# Patient Record
Sex: FEMALE | Employment: UNEMPLOYED | URBAN - METROPOLITAN AREA
[De-identification: names, ages, dates, MRNs, and addresses within clinical notes are randomized per-mention and may not be internally consistent; named-entity substitution may affect disease eponyms.]

---

## 2018-10-02 ENCOUNTER — APPOINTMENT (OUTPATIENT)
Dept: ULTRASOUND IMAGING | Facility: CLINIC | Age: 28
End: 2018-10-02
Attending: EMERGENCY MEDICINE
Payer: MEDICAID

## 2018-10-02 ENCOUNTER — HOSPITAL ENCOUNTER (EMERGENCY)
Facility: CLINIC | Age: 28
Discharge: HOME | End: 2018-10-02
Attending: EMERGENCY MEDICINE | Admitting: EMERGENCY MEDICINE
Payer: MEDICAID

## 2018-10-02 VITALS
WEIGHT: 160.5 LBS | SYSTOLIC BLOOD PRESSURE: 95 MMHG | HEART RATE: 106 BPM | RESPIRATION RATE: 14 BRPM | TEMPERATURE: 98.2 F | DIASTOLIC BLOOD PRESSURE: 60 MMHG | OXYGEN SATURATION: 95 %

## 2018-10-02 DIAGNOSIS — O23.41 INFECTION OF URINARY TRACT IN PREGNANCY IN FIRST TRIMESTER: ICD-10-CM

## 2018-10-02 DIAGNOSIS — Z3A.10 10 WEEKS GESTATION OF PREGNANCY: ICD-10-CM

## 2018-10-02 DIAGNOSIS — N39.0 URINARY TRACT INFECTION WITHOUT HEMATURIA, SITE UNSPECIFIED: ICD-10-CM

## 2018-10-02 LAB
ABO + RH BLD: NORMAL
ABO + RH BLD: NORMAL
ALBUMIN UR-MCNC: NEGATIVE MG/DL
AMORPH CRY #/AREA URNS HPF: ABNORMAL /HPF
ANION GAP SERPL CALCULATED.3IONS-SCNC: 10 MMOL/L (ref 3–14)
APPEARANCE UR: ABNORMAL
B-HCG SERPL-ACNC: ABNORMAL IU/L (ref 0–5)
BACTERIA #/AREA URNS HPF: ABNORMAL /HPF
BASOPHILS # BLD AUTO: 0 10E9/L (ref 0–0.2)
BASOPHILS NFR BLD AUTO: 0.2 %
BILIRUB UR QL STRIP: NEGATIVE
BLOOD BANK CMNT PATIENT-IMP: NORMAL
BLOOD BANK CMNT PATIENT-IMP: NORMAL
BUN SERPL-MCNC: 9 MG/DL (ref 7–30)
CALCIUM SERPL-MCNC: 8.7 MG/DL (ref 8.5–10.1)
CHLORIDE SERPL-SCNC: 104 MMOL/L (ref 94–109)
CO2 SERPL-SCNC: 24 MMOL/L (ref 20–32)
COLOR UR AUTO: YELLOW
CREAT SERPL-MCNC: 0.6 MG/DL (ref 0.52–1.04)
DIFFERENTIAL METHOD BLD: ABNORMAL
EOSINOPHIL # BLD AUTO: 0.2 10E9/L (ref 0–0.7)
EOSINOPHIL NFR BLD AUTO: 1.2 %
ERYTHROCYTE [DISTWIDTH] IN BLOOD BY AUTOMATED COUNT: 12 % (ref 10–15)
FETAL CELL SCN BLD QL ROSETTE: NORMAL
GFR SERPL CREATININE-BSD FRML MDRD: >90 ML/MIN/1.7M2
GLUCOSE SERPL-MCNC: 83 MG/DL (ref 70–99)
GLUCOSE UR STRIP-MCNC: NEGATIVE MG/DL
HCT VFR BLD AUTO: 36.7 % (ref 35–47)
HGB BLD-MCNC: 12 G/DL (ref 11.7–15.7)
HGB UR QL STRIP: ABNORMAL
IMM GRANULOCYTES # BLD: 0 10E9/L (ref 0–0.4)
IMM GRANULOCYTES NFR BLD: 0.2 %
KETONES UR STRIP-MCNC: NEGATIVE MG/DL
LEUKOCYTE ESTERASE UR QL STRIP: ABNORMAL
LYMPHOCYTES # BLD AUTO: 4.6 10E9/L (ref 0.8–5.3)
LYMPHOCYTES NFR BLD AUTO: 36.3 %
MCH RBC QN AUTO: 29.1 PG (ref 26.5–33)
MCHC RBC AUTO-ENTMCNC: 32.7 G/DL (ref 31.5–36.5)
MCV RBC AUTO: 89 FL (ref 78–100)
MONOCYTES # BLD AUTO: 0.4 10E9/L (ref 0–1.3)
MONOCYTES NFR BLD AUTO: 3.5 %
NEUTROPHILS # BLD AUTO: 7.4 10E9/L (ref 1.6–8.3)
NEUTROPHILS NFR BLD AUTO: 58.6 %
NITRATE UR QL: NEGATIVE
NRBC # BLD AUTO: 0 10*3/UL
NRBC BLD AUTO-RTO: 0 /100
PH UR STRIP: 7 PH (ref 5–7)
PLATELET # BLD AUTO: 206 10E9/L (ref 150–450)
POTASSIUM SERPL-SCNC: 3.6 MMOL/L (ref 3.4–5.3)
RBC # BLD AUTO: 4.13 10E12/L (ref 3.8–5.2)
RBC #/AREA URNS AUTO: 3 /HPF (ref 0–2)
RH IG VIALS RECOM PATIENT: NORMAL
SODIUM SERPL-SCNC: 138 MMOL/L (ref 133–144)
SOURCE: ABNORMAL
SP GR UR STRIP: 1.01 (ref 1–1.03)
SPECIMEN SOURCE: ABNORMAL
SQUAMOUS #/AREA URNS AUTO: 4 /HPF (ref 0–1)
UROBILINOGEN UR STRIP-MCNC: NORMAL MG/DL (ref 0–2)
WBC # BLD AUTO: 12.6 10E9/L (ref 4–11)
WBC #/AREA URNS AUTO: 7 /HPF (ref 0–5)
WET PREP SPEC: ABNORMAL

## 2018-10-02 PROCEDURE — 85025 COMPLETE CBC W/AUTO DIFF WBC: CPT | Performed by: EMERGENCY MEDICINE

## 2018-10-02 PROCEDURE — 84702 CHORIONIC GONADOTROPIN TEST: CPT | Performed by: EMERGENCY MEDICINE

## 2018-10-02 PROCEDURE — 87086 URINE CULTURE/COLONY COUNT: CPT | Performed by: EMERGENCY MEDICINE

## 2018-10-02 PROCEDURE — 80048 BASIC METABOLIC PNL TOTAL CA: CPT | Performed by: EMERGENCY MEDICINE

## 2018-10-02 PROCEDURE — 87591 N.GONORRHOEAE DNA AMP PROB: CPT | Performed by: EMERGENCY MEDICINE

## 2018-10-02 PROCEDURE — 86900 BLOOD TYPING SEROLOGIC ABO: CPT | Performed by: EMERGENCY MEDICINE

## 2018-10-02 PROCEDURE — 99284 EMERGENCY DEPT VISIT MOD MDM: CPT | Mod: Z6 | Performed by: EMERGENCY MEDICINE

## 2018-10-02 PROCEDURE — 86901 BLOOD TYPING SEROLOGIC RH(D): CPT | Performed by: EMERGENCY MEDICINE

## 2018-10-02 PROCEDURE — 81001 URINALYSIS AUTO W/SCOPE: CPT | Performed by: EMERGENCY MEDICINE

## 2018-10-02 PROCEDURE — 87210 SMEAR WET MOUNT SALINE/INK: CPT | Performed by: EMERGENCY MEDICINE

## 2018-10-02 PROCEDURE — 87491 CHLMYD TRACH DNA AMP PROBE: CPT | Performed by: EMERGENCY MEDICINE

## 2018-10-02 PROCEDURE — 99284 EMERGENCY DEPT VISIT MOD MDM: CPT | Mod: 25

## 2018-10-02 PROCEDURE — 76801 OB US < 14 WKS SINGLE FETUS: CPT

## 2018-10-02 PROCEDURE — 85461 HEMOGLOBIN FETAL: CPT | Performed by: EMERGENCY MEDICINE

## 2018-10-02 RX ORDER — CEPHALEXIN 500 MG/1
500 CAPSULE ORAL 4 TIMES DAILY
Qty: 28 CAPSULE | Refills: 0 | Status: SHIPPED | OUTPATIENT
Start: 2018-10-02 | End: 2018-10-09

## 2018-10-02 RX ORDER — ASPIRIN 81 MG
100 TABLET, DELAYED RELEASE (ENTERIC COATED) ORAL DAILY
Qty: 60 TABLET | Refills: 1 | Status: SHIPPED | OUTPATIENT
Start: 2018-10-02

## 2018-10-02 ASSESSMENT — ENCOUNTER SYMPTOMS
VOMITING: 0
FEVER: 0
ABDOMINAL PAIN: 1
NAUSEA: 1

## 2018-10-02 NOTE — ED AVS SNAPSHOT
[facility], Emergency Department    [address]    [place] [address]    Phone:  [phone]    Fax:  [phone]                                       Jewell Gonzalez   MRN: [id number]    Department:  [facility], Emergency Department   Date of Visit:  10/2/2018           Patient Information     Date Of Birth          1990        Your diagnoses for this visit were:     Urinary tract infection without hematuria, site unspecified        You were seen by Harris Ahn MD.        Discharge Instructions       Please make an appointment to follow up with Your Primary Care Provider as soon as possible if you have any concerns.  Results for orders placed or performed during the hospital encounter of 10/02/18   US OB < 14 Weeks w Transvaginal    Narrative    US OB <14 WEEKS WITH TRANSVAGINAL SINGLE 10/2/2018 8:25 PM     INDICATION: Bleeding in pregnancy, LMP 6/31.     COMPARISON: None.    FINDINGS: Transabdominal ultrasound demonstrates an early live IUP  measuring 10 weeks and 1 day by crown-rump length. Fetal heart rate  169 BPM. The KIA by crown-rump length is 4/29/2019. Ovaries within  normal limits. Corpus luteum on the right. No free fluid.      Impression    IMPRESSION: Early live IUP 10 weeks and 1 day.   CBC with platelets differential   Result Value Ref Range    WBC 12.6 (H) 4.0 - 11.0 10e9/L    RBC Count 4.13 3.8 - 5.2 10e12/L    Hemoglobin 12.0 11.7 - 15.7 g/dL    Hematocrit 36.7 35.0 - 47.0 %    MCV 89 78 - 100 fl    MCH 29.1 26.5 - 33.0 pg    MCHC 32.7 31.5 - 36.5 g/dL    RDW 12.0 10.0 - 15.0 %    Platelet Count 206 150 - 450 10e9/L    Diff Method Automated Method     % Neutrophils 58.6 %    % Lymphocytes 36.3 %    % Monocytes 3.5 %    % Eosinophils 1.2 %    % Basophils 0.2 %    % Immature Granulocytes 0.2 %    Nucleated RBCs 0 0 /100    Absolute Neutrophil 7.4 1.6 - 8.3 10e9/L    Absolute Lymphocytes 4.6 0.8 - 5.3 10e9/L    Absolute Monocytes 0.4 0.0 - 1.3 10e9/L    Absolute  Eosinophils 0.2 0.0 - 0.7 10e9/L    Absolute Basophils 0.0 0.0 - 0.2 10e9/L    Abs Immature Granulocytes 0.0 0 - 0.4 10e9/L    Absolute Nucleated RBC 0.0    Basic metabolic panel   Result Value Ref Range    Sodium 138 133 - 144 mmol/L    Potassium 3.6 3.4 - 5.3 mmol/L    Chloride 104 94 - 109 mmol/L    Carbon Dioxide 24 20 - 32 mmol/L    Anion Gap 10 3 - 14 mmol/L    Glucose 83 70 - 99 mg/dL    Urea Nitrogen 9 7 - 30 mg/dL    Creatinine 0.60 0.52 - 1.04 mg/dL    GFR Estimate >90 >60 mL/min/1.7m2    GFR Estimate If Black >90 >60 mL/min/1.7m2    Calcium 8.7 8.5 - 10.1 mg/dL   HCG quantitative pregnancy   Result Value Ref Range    HCG Quantitative Serum 705661 (H) 0 - 5 IU/L   UA with Microscopic reflex to Culture   Result Value Ref Range    Color Urine Yellow     Appearance Urine Slightly Cloudy     Glucose Urine Negative NEG^Negative mg/dL    Bilirubin Urine Negative NEG^Negative    Ketones Urine Negative NEG^Negative mg/dL    Specific Gravity Urine 1.014 1.003 - 1.035    Blood Urine Trace (A) NEG^Negative    pH Urine 7.0 5.0 - 7.0 pH    Protein Albumin Urine Negative NEG^Negative mg/dL    Urobilinogen mg/dL Normal 0.0 - 2.0 mg/dL    Nitrite Urine Negative NEG^Negative    Leukocyte Esterase Urine Large (A) NEG^Negative    Source Midstream Urine     WBC Urine 7 (H) 0 - 5 /HPF    RBC Urine 3 (H) 0 - 2 /HPF    Bacteria Urine Few (A) NEG^Negative /HPF    Squamous Epithelial /HPF Urine 4 (H) 0 - 1 /HPF    Amorphous Crystals Few (A) NEG^Negative /HPF   Rho (D) immune globulin (RhoGam) Lab Study   Result Value Ref Range    Rhogam Order Order received    Rh Immune Globulin Study   Result Value Ref Range    ABO A     RH(D) Pos     Fetal Blood Screen Canceled, Test credited     Blood Bank Comment       Not suitable for Rh Immune Globulin  Patient is Rh Positive      Amount of RHIG Required Not suitable for Rh Immune Globulin    Urine Culture Aerobic Bacterial   Result Value Ref Range    Specimen Description Midstream Urine      Special Requests Specimen received in preservative     Culture Micro PENDING          * INFECCIÓN DE LA VEJIGA,Regina [Bladder Infection: Female, adult]    Symone infección de la vejiga ( cistitis  [cystitis - UTI]) suele provocar constantes deseos de orinar y ardor al orinar. Es posible que la orina se jt turbia u oscura, o que tenga olor rashard. Puede reddy dolor en la parte baja del abdomen. Symone infección de la vejiga se produce cuando las bacterias del área vaginal ingresan al orificio donde desemboca la vejiga (la uretra [urethra]). Puede ocurrir después de reddy tenido relaciones sexuales, por usar ropas muy ajustadas, por deshidratación y otros factores.  CUIDADOS EN LA CASA:    1. Yaneli abundante líquido (al menos, entre 6 y 8 vasos por día, excepto que le hayan indicado limitar los líquidos por otras razones médicas). Eso hará que el medicamento ingrese mejor al sistema urinario y arrastrará las bacterias fuera de espinal cuerpo.  2. Evite tener relaciones sexuales hasta que los síntomas hayan desaparecido.  3. Symone infección de la vejiga (bladder infection) se trata con antibióticos (antibiotics). También es posible que le receten Pyridum (nombre genérico: fenazopiridina [phenazopyridine]) para aliviar el ardor. Jenifer medicamento hará que espinal orina sea de color naranja brillante. Es posible que devi orina de color naranja le manche la ropa. Puede usar un protector diario o symone toalla femenina para proteger la ropa.  EVITE FUTURAS INFECCIONES:    1. Después de evacuar el intestino, siempre límpiese con un movimiento de adelante hacia atrás.  2. Mantenga la radha genital limpia y seca.  3. Yaneli mucho líquidos todos los días para evitar la deshidratación (dehydration).  4. Orine kodi después del acto sexual para limpiar la vejiga.  5. Use ropa interior de algodón y pantimedias con recubrimiento de algodón. Evite usar pantalones muy ajustados.  6. Si está tomando píldoras anticonceptivas y tiene frecuentes infecciones de  joshua, háblelo con espinal médico.  VISITA DE CONTROL: Regrese a everardo centro o visite a espianl médico si no buchanan desparecido TODOS los síntomas después de ramona días de tratamiento.  BUSQUE PRONTAMENTE ATENCIÓN MÉDICA si algo de lo siguiente ocurre:    Fiebre de 101 F (38.3 C) o más luiza, o mason le haya indicado espinal proveedor de atención médica.    No hay mejoría después de ramona suki de tratamiento.    Mayor dolor en la espalda o el abdomen.    Vómito persistente (no puede mantener el medicamento en el estómago).    Debilidad, mareo o desmayo.    Secreción vaginal.    Dolor, enrojecimiento o hinchazón en los labios vaginales (radha exterior de la vagina).    8642-6629 [employer] [address] [place] [address]. All rights reserved. This information is not intended as a substitute for professional medical care. Always follow your healthcare professional's instructions.  This information has been modified by your health care provider with permission from the publisher.      24 Hour Appointment Hotline       To make an appointment at any [facility], call [phone] ([phone]). If you don't have a family doctor or clinic, we will help you find one. [facility] clinics are conveniently located to serve the needs of you and your family.             Review of your medicines      START taking        Dose / Directions Last dose taken    cephALEXin 500 MG capsule   Commonly known as:  KEFLEX   Dose:  500 mg   Quantity:  28 capsule        Take 1 capsule (500 mg) by mouth 4 times daily for 7 days   Refills:  0        docusate sodium 100 MG tablet   Commonly known as:  COLACE   Dose:  100 mg   Quantity:  60 tablet        Take 100 mg by mouth daily   Refills:  1                Prescriptions were sent or printed at these locations (2 Prescriptions)                   Other Prescriptions                Printed at Department/Unit printer (2 of 2)         cephALEXin (KEFLEX) 500 MG capsule                "docusate sodium (COLACE) 100 MG tablet                Procedures and tests performed during your visit     Basic metabolic panel    CBC with platelets differential    HCG quantitative pregnancy    Prep for procedure - pelvic exam    Rh Immune Globulin Study    Rho (D) immune globulin (RhoGam) Lab Study    UA with Microscopic reflex to Culture    US OB < 14 Weeks w Transvaginal    Urine Culture Aerobic Bacterial      Orders Needing Specimen Collection     None      Pending Results     Date and Time Order Name Status Description    10/2/2018 1814 Urine Culture Aerobic Bacterial Preliminary     10/2/2018 1753 US OB < 14 Weeks w Transvaginal Preliminary             Pending Culture Results     Date and Time Order Name Status Description    10/2/2018 1814 Urine Culture Aerobic Bacterial Preliminary             Pending Results Instructions     If you had any lab results that were not finalized at the time of your Discharge, you can call the ED Lab Result RN at [phone]. You will be contacted by this team for any positive Lab results or changes in treatment. The nurses are available 7 days a week from 10A to 6:30P.  You can leave a message 24 hours per day and they will return your call.        Thank you for choosing [facility]       Thank you for choosing [facility] for your care. Our goal is always to provide you with excellent care. Hearing back from our patients is one way we can continue to improve our services. Please take a few minutes to complete the written survey that you may receive in the mail after you visit with us. Thank you!        [facility] Information     [employer] lets you send messages to your doctor, view your test results, renew your prescriptions, schedule appointments and more. To sign up, go to [email or web address] . Click on \"Log in\" on the left side of the screen, which will take you to the Welcome page. Then click on \"Sign up Now\" on the right side of the page.     You will be asked to enter the " access code listed below, as well as some personal information. Please follow the directions to create your username and password.     Your access code is: [email or web address]  Expires: 2018  6:12 PM     Your access code will  in 90 days. If you need help or a new code, please call your [facility] clinic or [phone].        Care EveryWhere ID     This is your Care EveryWhere ID. This could be used by other organizations to access your [facility] medical records  [id number]        Equal Access to Services     [facility] HospitalNA[facility] : Thi Mendez, azucena fields, ana allenalmikayla johnson, riri franco . So [facility] [phone].    ATENCIÓN: Si habla español, tiene a espinal disposición servicios gratuitos de asistencia lingüística. Llame al [phone].    We comply with applicable federal civil rights laws and [place] laws. We do not discriminate on the basis of race, color, national origin, age, disability, sex, sexual orientation, or gender identity.            After Visit Summary       This is your record. Keep this with you and show to your community pharmacist(s) and doctor(s) at your next visit.

## 2018-10-02 NOTE — ED PROVIDER NOTES
History     Chief Complaint   Patient presents with     Vaginal Bleeding     Onset yesterday with vaginal bleeding, increased today with cramping, 2 months pregnant.     CHARLIE Gonzalez is a 28 year old female who is status post cholecystectomy who is  who presents to the Emergency Department today for abdominal pain and vaginal bleeding. The patient reports that she did have a positive pregnancy test about one month ago, but states that she was never seen in clinic for her pregnancy yet. The patient reports that 2 days ago she started having lower, cramping abdominal pain. She states that she then started having vaginal bleeding yesterday and notes that she has passed a clot. The patient also has some nausea but she denies any vomiting. She also has subjective chills. The patient also reports that a month ago she did have symptoms consistent with a UTI including, dysuria and urgency without being able to pass urine. She was not evaluated for these symptoms. The patient denies any daily medication.     I have reviewed the Medications, Allergies, Past Medical and Surgical History, and Social History in the Epic system.    No past medical history on file.    No past surgical history on file.    No family history on file.    Social History   Substance Use Topics     Smoking status: Not on file     Smokeless tobacco: Not on file     Alcohol use Not on file       No current facility-administered medications for this encounter.      Current Outpatient Prescriptions   Medication     docusate sodium (COLACE) 100 MG tablet      No Known Allergies     Review of Systems   Constitutional: Negative for fever.   Gastrointestinal: Positive for abdominal pain and nausea. Negative for vomiting.   Genitourinary: Positive for vaginal bleeding.   All other systems reviewed and are negative.    Physical Exam   BP: 106/54  Pulse: 90  Heart Rate: 90  Temp: 97.3  F (36.3  C)  Resp: 16  Weight: 72.8 kg (160 lb 8 oz)  SpO2: 99  %    Physical Exam   Constitutional: She is oriented to person, place, and time. She appears well-developed and well-nourished. No distress.   HENT:   Head: Normocephalic and atraumatic.   Eyes: No scleral icterus.   Neck: Normal range of motion. Neck supple.   Cardiovascular: Normal rate.    Abdominal: Soft. There is no tenderness.   Genitourinary:   Genitourinary Comments: No active bleeding   Neurological: She is alert and oriented to person, place, and time.   Skin: Skin is warm and dry. No rash noted. She is not diaphoretic. No erythema. No pallor.       ED Course     ED Course     Procedures             Critical Care time:  none             Labs Ordered and Resulted from Time of ED Arrival Up to the Time of Departure from the ED   CBC WITH PLATELETS DIFFERENTIAL - Abnormal; Notable for the following:        Result Value    WBC 12.6 (*)     All other components within normal limits   HCG QUANTITATIVE PREGNANCY - Abnormal; Notable for the following:     HCG Quantitative Serum 600003 (*)     All other components within normal limits   ROUTINE UA WITH MICROSCOPIC REFLEX TO CULTURE - Abnormal; Notable for the following:     Blood Urine Trace (*)     Leukocyte Esterase Urine Large (*)     WBC Urine 7 (*)     RBC Urine 3 (*)     Bacteria Urine Few (*)     Squamous Epithelial /HPF Urine 4 (*)     Amorphous Crystals Few (*)     All other components within normal limits   BASIC METABOLIC PANEL   PREP FOR PROCEDURE   RHOGAM ORDER   RH IMMUNE GLOBULIN STUDY            Assessments & Plan (with Medical Decision Making)   This is a 28-year-old female patient coming into emergency room with complaints of vaginal bleeding and pregnancy.  Her physical exam is otherwise unremarkable.  She has no active bleeding at the time of my exam.  She is 10 weeks and 1 day gestation.  She does have a positive UA concerning for an infection.  We did do swabs however the patient wanted to be discharged and will follow up with her primary care  physician for results of her swabs.  At this time I believe she can be safely discharged with a prescription for Keflex and Dulcolax and can follow-up with her primary care doctor.  She was discharged with aftercare instructions and reasons to return to the emergency room.    I have reviewed the nursing notes.    I have reviewed the findings, diagnosis, plan and need for follow up with the patient.    Discharge Medication List as of 10/2/2018  9:30 PM      START taking these medications    Details   cephALEXin (KEFLEX) 500 MG capsule Take 1 capsule (500 mg) by mouth 4 times daily for 7 days, Disp-28 capsule, R-0, Local Print      docusate sodium (COLACE) 100 MG tablet Take 100 mg by mouth daily, Disp-60 tablet, R-1, Local Print             Final diagnoses:   Urinary tract infection without hematuria, site unspecified   IDevon, am serving as a trained medical scribe to document services personally performed by Harris Ahn MD, based on the provider's statements to me.   IHarris MD, was physically present and have reviewed and verified the accuracy of this note documented by Devon Gaston.     10/2/2018   [facility], [place], EMERGENCY DEPARTMENT     Harris Ahn MD  10/11/18 0032

## 2018-10-02 NOTE — ED AVS SNAPSHOT
[facility], [place], Emergency Department    [address] [place] [address]    Phone:  [phone]    Fax:  [phone]                                       Jewell Gonzalez   MRN: [id number]    Department:  [facility], Emergency Department   Date of Visit:  10/2/2018           After Visit Summary Signature Page     I have received my discharge instructions, and my questions have been answered. I have discussed any challenges I see with this plan with the nurse or doctor.    ..........................................................................................................................................  Patient/Patient Representative Signature      ..........................................................................................................................................  Patient Representative Print Name and Relationship to Patient    ..................................................               ................................................  Date                                   Time    ..........................................................................................................................................  Reviewed by Signature/Title    ...................................................              ..............................................  Date                                               Time          22EPIC Rev 08/18

## 2018-10-03 LAB
BACTERIA SPEC CULT: NORMAL
C TRACH DNA SPEC QL NAA+PROBE: NEGATIVE
Lab: NORMAL
N GONORRHOEA DNA SPEC QL NAA+PROBE: NEGATIVE
SPECIMEN SOURCE: NORMAL

## 2018-10-03 NOTE — DISCHARGE INSTRUCTIONS
Please make an appointment to follow up with Your Primary Care Provider as soon as possible if you have any concerns.  Results for orders placed or performed during the hospital encounter of 10/02/18   US OB < 14 Weeks w Transvaginal    Narrative    US OB <14 WEEKS WITH TRANSVAGINAL SINGLE 10/2/2018 8:25 PM     INDICATION: Bleeding in pregnancy, LMP 6/31.     COMPARISON: None.    FINDINGS: Transabdominal ultrasound demonstrates an early live IUP  measuring 10 weeks and 1 day by crown-rump length. Fetal heart rate  169 BPM. The KIA by crown-rump length is 4/29/2019. Ovaries within  normal limits. Corpus luteum on the right. No free fluid.      Impression    IMPRESSION: Early live IUP 10 weeks and 1 day.   CBC with platelets differential   Result Value Ref Range    WBC 12.6 (H) 4.0 - 11.0 10e9/L    RBC Count 4.13 3.8 - 5.2 10e12/L    Hemoglobin 12.0 11.7 - 15.7 g/dL    Hematocrit 36.7 35.0 - 47.0 %    MCV 89 78 - 100 fl    MCH 29.1 26.5 - 33.0 pg    MCHC 32.7 31.5 - 36.5 g/dL    RDW 12.0 10.0 - 15.0 %    Platelet Count 206 150 - 450 10e9/L    Diff Method Automated Method     % Neutrophils 58.6 %    % Lymphocytes 36.3 %    % Monocytes 3.5 %    % Eosinophils 1.2 %    % Basophils 0.2 %    % Immature Granulocytes 0.2 %    Nucleated RBCs 0 0 /100    Absolute Neutrophil 7.4 1.6 - 8.3 10e9/L    Absolute Lymphocytes 4.6 0.8 - 5.3 10e9/L    Absolute Monocytes 0.4 0.0 - 1.3 10e9/L    Absolute Eosinophils 0.2 0.0 - 0.7 10e9/L    Absolute Basophils 0.0 0.0 - 0.2 10e9/L    Abs Immature Granulocytes 0.0 0 - 0.4 10e9/L    Absolute Nucleated RBC 0.0    Basic metabolic panel   Result Value Ref Range    Sodium 138 133 - 144 mmol/L    Potassium 3.6 3.4 - 5.3 mmol/L    Chloride 104 94 - 109 mmol/L    Carbon Dioxide 24 20 - 32 mmol/L    Anion Gap 10 3 - 14 mmol/L    Glucose 83 70 - 99 mg/dL    Urea Nitrogen 9 7 - 30 mg/dL    Creatinine 0.60 0.52 - 1.04 mg/dL    GFR Estimate >90 >60 mL/min/1.7m2    GFR Estimate If Black >90 >60  mL/min/1.7m2    Calcium 8.7 8.5 - 10.1 mg/dL   HCG quantitative pregnancy   Result Value Ref Range    HCG Quantitative Serum 380186 (H) 0 - 5 IU/L   UA with Microscopic reflex to Culture   Result Value Ref Range    Color Urine Yellow     Appearance Urine Slightly Cloudy     Glucose Urine Negative NEG^Negative mg/dL    Bilirubin Urine Negative NEG^Negative    Ketones Urine Negative NEG^Negative mg/dL    Specific Gravity Urine 1.014 1.003 - 1.035    Blood Urine Trace (A) NEG^Negative    pH Urine 7.0 5.0 - 7.0 pH    Protein Albumin Urine Negative NEG^Negative mg/dL    Urobilinogen mg/dL Normal 0.0 - 2.0 mg/dL    Nitrite Urine Negative NEG^Negative    Leukocyte Esterase Urine Large (A) NEG^Negative    Source Midstream Urine     WBC Urine 7 (H) 0 - 5 /HPF    RBC Urine 3 (H) 0 - 2 /HPF    Bacteria Urine Few (A) NEG^Negative /HPF    Squamous Epithelial /HPF Urine 4 (H) 0 - 1 /HPF    Amorphous Crystals Few (A) NEG^Negative /HPF   Rho (D) immune globulin (RhoGam) Lab Study   Result Value Ref Range    Rhogam Order Order received    Rh Immune Globulin Study   Result Value Ref Range    ABO A     RH(D) Pos     Fetal Blood Screen Canceled, Test credited     Blood Bank Comment       Not suitable for Rh Immune Globulin  Patient is Rh Positive      Amount of RHIG Required Not suitable for Rh Immune Globulin    Urine Culture Aerobic Bacterial   Result Value Ref Range    Specimen Description Midstream Urine     Special Requests Specimen received in preservative     Culture Micro PENDING          * INFECCIÓN DE LA VEJIGA,Regina [Bladder Infection: Female, adult]    Myrna infección de la vejiga ( cistitis  [cystitis - UTI]) suele provocar constantes deseos de orinar y ardor al orinar. Es posible que la orina se jt turbia u oscura, o que tenga olor rashard. Puede reddy dolor en la parte baja del abdomen. Myrna infección de la vejiga se produce cuando las bacterias del área vaginal ingresan al orificio donde desemboca la vejiga (la uretra  [urethra]). Puede ocurrir después de reddy tenido relaciones sexuales, por usar ropas muy ajustadas, por deshidratación y otros factores.  CUIDADOS EN LA CASA:    1. Yaneli abundante líquido (al menos, entre 6 y 8 vasos por día, excepto que le hayan indicado limitar los líquidos por otras razones médicas). Eso hará que el medicamento ingrese mejor al sistema urinario y arrastrará las bacterias fuera de espinal cuerpo.  2. Evite tener relaciones sexuales hasta que los síntomas hayan desaparecido.  3. Symone infección de la vejiga (bladder infection) se trata con antibióticos (antibiotics). También es posible que le receten Pyridum (nombre genérico: fenazopiridina [phenazopyridine]) para aliviar el ardor. Everardo medicamento hará que espinal orina sea de color naranja brillante. Es posible que devi orina de color naranja le manche la ropa. Puede usar un protector diario o syomne toalla femenina para proteger la ropa.  EVITE FUTURAS INFECCIONES:    1. Después de evacuar el intestino, siempre límpiese con un movimiento de adelante hacia atrás.  2. Mantenga la radha genital limpia y seca.  3. Yaneli mucho líquidos todos los días para evitar la deshidratación (dehydration).  4. Orine kodi después del acto sexual para limpiar la vejiga.  5. Use ropa interior de algodón y pantimedias con recubrimiento de algodón. Evite usar pantalones muy ajustados.  6. Si está tomando píldoras anticonceptivas y tiene frecuentes infecciones de vejiga, háblelo con espinal médico.  VISITA DE CONTROL: Regrese a everardo centro o visite a espinal médico si no buchanan desparecido TODOS los síntomas después de ramona días de tratamiento.  BUSQUE PRONTAMENTE ATENCIÓN MÉDICA si algo de lo siguiente ocurre:    Fiebre de 101 F (38.3 C) o más luiza, o mason le haya indicado espinal proveedor de atención médica.    No hay mejoría después de ramona suki de tratamiento.    Mayor dolor en la espalda o el abdomen.    Vómito persistente (no puede mantener el medicamento en el estómago).    Debilidad, mareo o  teresa.    Secreción vaginal.    Dolor, enrojecimiento o hinchazón en los labios vaginales (radha exterior de la vagina).    [id number] [employer]. [address] [place] [address], [place] [address]. All rights reserved. This information is not intended as a substitute for professional medical care. Always follow your healthcare professional's instructions.  This information has been modified by your health care provider with permission from the publisher.